# Patient Record
Sex: FEMALE | Race: WHITE | NOT HISPANIC OR LATINO | Employment: FULL TIME | URBAN - METROPOLITAN AREA
[De-identification: names, ages, dates, MRNs, and addresses within clinical notes are randomized per-mention and may not be internally consistent; named-entity substitution may affect disease eponyms.]

---

## 2017-05-05 ENCOUNTER — COMMUNICATION - HEALTHEAST (OUTPATIENT)
Dept: FAMILY MEDICINE | Facility: CLINIC | Age: 57
End: 2017-05-05

## 2017-05-09 ENCOUNTER — OFFICE VISIT - HEALTHEAST (OUTPATIENT)
Dept: FAMILY MEDICINE | Facility: CLINIC | Age: 57
End: 2017-05-09

## 2017-05-09 ENCOUNTER — COMMUNICATION - HEALTHEAST (OUTPATIENT)
Dept: FAMILY MEDICINE | Facility: CLINIC | Age: 57
End: 2017-05-09

## 2017-05-09 DIAGNOSIS — Z00.00 HEALTH CARE MAINTENANCE: ICD-10-CM

## 2017-05-09 DIAGNOSIS — N63.0 LUMP OR MASS IN BREAST: ICD-10-CM

## 2017-05-09 LAB
CHOLEST SERPL-MCNC: 186 MG/DL
FASTING STATUS PATIENT QL REPORTED: YES
HDLC SERPL-MCNC: 52 MG/DL
LDLC SERPL CALC-MCNC: 114 MG/DL
TRIGL SERPL-MCNC: 98 MG/DL

## 2017-05-09 ASSESSMENT — MIFFLIN-ST. JEOR: SCORE: 1619.1

## 2017-05-12 ENCOUNTER — HOSPITAL ENCOUNTER (OUTPATIENT)
Dept: MAMMOGRAPHY | Facility: CLINIC | Age: 57
Discharge: HOME | End: 2017-05-12
Attending: NURSE PRACTITIONER

## 2017-05-12 ENCOUNTER — HOSPITAL ENCOUNTER (OUTPATIENT)
Dept: ULTRASOUND IMAGING | Facility: CLINIC | Age: 57
Discharge: HOME | End: 2017-05-12
Attending: NURSE PRACTITIONER

## 2017-05-12 DIAGNOSIS — N63.0 LUMP OR MASS IN BREAST: ICD-10-CM

## 2017-05-16 LAB
BKR LAB AP ABNORMAL BLEEDING: NO
BKR LAB AP BIRTH CONTROL/HORMONES: NORMAL
BKR LAB AP CERVICAL APPEARANCE: NORMAL
BKR LAB AP GYN ADEQUACY: NORMAL
BKR LAB AP GYN INTERPRETATION: NORMAL
BKR LAB AP HPV REFLEX: NO
BKR LAB AP LMP: NORMAL
BKR LAB AP PATIENT STATUS: NORMAL
BKR LAB AP PREVIOUS ABNORMAL: NORMAL
BKR LAB AP PREVIOUS NORMAL: NORMAL
HIGH RISK?: NO
PATH REPORT.COMMENTS IMP SPEC: NORMAL
RESULT FLAG (HE HISTORICAL CONVERSION): NORMAL

## 2017-09-20 ENCOUNTER — AMBULATORY - HEALTHEAST (OUTPATIENT)
Dept: LAB | Facility: CLINIC | Age: 57
End: 2017-09-20

## 2017-09-20 DIAGNOSIS — Z79.899 ENCOUNTER FOR LONG-TERM (CURRENT) USE OF MEDICATIONS: ICD-10-CM

## 2017-09-20 LAB
ALT SERPL W P-5'-P-CCNC: 21 U/L (ref 0–45)
AST SERPL W P-5'-P-CCNC: 21 U/L (ref 0–40)
CREAT SERPL-MCNC: 0.72 MG/DL (ref 0.6–1.1)
GFR SERPL CREATININE-BSD FRML MDRD: >60 ML/MIN/1.73M2

## 2017-10-10 ENCOUNTER — AMBULATORY - HEALTHEAST (OUTPATIENT)
Dept: LAB | Facility: CLINIC | Age: 57
End: 2017-10-10

## 2017-10-10 DIAGNOSIS — L70.8 OTHER ACNE: ICD-10-CM

## 2017-10-10 DIAGNOSIS — Z79.899 ENCOUNTER FOR LONG-TERM (CURRENT) USE OF MEDICATIONS: ICD-10-CM

## 2018-04-27 ENCOUNTER — OFFICE VISIT - HEALTHEAST (OUTPATIENT)
Dept: FAMILY MEDICINE | Facility: CLINIC | Age: 58
End: 2018-04-27

## 2018-09-07 ENCOUNTER — OFFICE VISIT - HEALTHEAST (OUTPATIENT)
Dept: FAMILY MEDICINE | Facility: CLINIC | Age: 58
End: 2018-09-07

## 2018-09-07 DIAGNOSIS — Z86.2 HISTORY OF ANEMIA: ICD-10-CM

## 2018-09-07 LAB
BASOPHILS # BLD AUTO: 0 THOU/UL (ref 0–0.2)
BASOPHILS NFR BLD AUTO: 0 % (ref 0–2)
EOSINOPHIL # BLD AUTO: 0.1 THOU/UL (ref 0–0.4)
EOSINOPHIL NFR BLD AUTO: 3 % (ref 0–6)
ERYTHROCYTE [DISTWIDTH] IN BLOOD BY AUTOMATED COUNT: 13.8 % (ref 11–14.5)
FERRITIN SERPL-MCNC: 31 NG/ML (ref 10–130)
FOLATE SERPL-MCNC: 11.7 NG/ML
HCT VFR BLD AUTO: 40.5 % (ref 35–47)
HGB BLD-MCNC: 13.5 G/DL (ref 12–16)
LYMPHOCYTES # BLD AUTO: 1.4 THOU/UL (ref 0.8–4.4)
LYMPHOCYTES NFR BLD AUTO: 37 % (ref 20–40)
MCH RBC QN AUTO: 27.6 PG (ref 27–34)
MCHC RBC AUTO-ENTMCNC: 33.4 G/DL (ref 32–36)
MCV RBC AUTO: 82 FL (ref 80–100)
MONOCYTES # BLD AUTO: 0.3 THOU/UL (ref 0–0.9)
MONOCYTES NFR BLD AUTO: 8 % (ref 2–10)
NEUTROPHILS # BLD AUTO: 2 THOU/UL (ref 2–7.7)
NEUTROPHILS NFR BLD AUTO: 52 % (ref 50–70)
PLATELET # BLD AUTO: 288 THOU/UL (ref 140–440)
PMV BLD AUTO: 7.6 FL (ref 7–10)
RBC # BLD AUTO: 4.91 MILL/UL (ref 3.8–5.4)
VIT B12 SERPL-MCNC: 465 PG/ML (ref 213–816)
WBC: 3.9 THOU/UL (ref 4–11)

## 2021-02-15 ENCOUNTER — TRANSFERRED RECORDS (OUTPATIENT)
Dept: MULTI SPECIALTY CLINIC | Facility: CLINIC | Age: 61
End: 2021-02-15

## 2021-02-15 LAB — PAP-ABSTRACT: NORMAL

## 2021-05-30 VITALS — BODY MASS INDEX: 36.39 KG/M2 | WEIGHT: 226.44 LBS | HEIGHT: 66 IN

## 2021-06-01 VITALS — BODY MASS INDEX: 36.81 KG/M2 | WEIGHT: 226 LBS

## 2021-06-01 VITALS — BODY MASS INDEX: 35.67 KG/M2 | WEIGHT: 219 LBS

## 2021-06-10 NOTE — PROGRESS NOTES
Labs reviewed and within normal limits with exception of slightly low HGB. Recommend if noticing any fatigue she can start a multivitamin with iron.

## 2021-06-10 NOTE — PROGRESS NOTES
ASSESSMENT & PLAN:  1. Health care maintenance  Update fasting lab work today and patient due for Pap smear.  Will follow for results and let her know when these are available.  Patient feeling well without concerns besides to investigate the lump she found in the breast.  She takes no current medications and so is not requiring refills today.  I did encourage her to take 2000 IUs of vitamin D daily.  She is planning to increase her exercise regime and start using my fitness pal for tracking calorie intake.  Encouraged her in this endeavor.  - Gynecologic Cytology (PAP Smear)  - Lipid Cascade FASTING  - Thyroid [place]  - Basic Metabolic Panel  - HM2(CBC w/o Differential)    2. Lump or mass in breast  She due for screening mammogram but given the lump in the left breast will also do diagnostic imaging.  Discussed with our  to try to get the patient an appointment in the next couple days.  - US Breast Limited (Focal) Left; Future  - Mammo Diagnostic Bilateral; Future      There are no Patient Instructions on file for this visit.    Orders Placed This Encounter   Procedures     US Breast Limited (Focal) Left     Standing Status:   Future     Standing Expiration Date:   5/9/2018     Order Specific Question:   Is the patient pregnant?     Answer:   No     Order Specific Question:   Can the procedure be changed per Radiologist protocol?     Answer:   Yes     Mammo Diagnostic Bilateral     Standing Status:   Future     Standing Expiration Date:   5/9/2018     Order Specific Question:   Patient's previous breast density:     Answer:   Heterogeneously dense [3]     Order Specific Question:   Is the patient pregnant?     Answer:   No     Order Specific Question:   Can the procedure be changed per Radiologist protocol?     Answer:   Yes     Lipid [place] FASTING     Order Specific Question:   Fasting is required?     Answer:   Yes     Thyroid [place]     Basic Metabolic Panel     HM2(CBC w/o Differential)      Medications Discontinued During This Encounter   Medication Reason     omeprazole (PRILOSEC) 20 MG capsule Therapy completed           General  Immunizations reviewed and updated .  Alcohol use, safety and moderation discussed.  Recommended adequate calcium intake/osteoporosis prevention.  Discussed colon cancer screening at age 50, 45 if -American.  Diet and exercise reviewed, including goal of aerobic exercise 30-90 minutes most days of the week, moderation of portions sizes, avoiding eating out and fast food and increase in fruits and vegetables.  Discussed safe sex practices.  Discussed & recommended seat belt (& motorcycle helmet) use.  Discussed & recommended smoke detector.  Discussed sun protection.  Discussed weight management.    The following high BMI interventions were performed this visit: encouragement to exercise, weight monitoring and dietary management education, guidance, and counseling    CHIEF COMPLAINT:  Chief Complaint   Patient presents with     Establish Care     px, pt is fasting, pt found a lump in her left breast that she would like to have checked        HISTORY OF PRESENT ILLNESS:  Danisha is a 56 y.o. female presenting to the clinic today for a physical examination.  She is feeling generally well and is without complaints except for having found a lump in her left breast about a week ago.  She is due for mammogram and so would like to have this checked anyway, she has had some cysts found before in prior mammograms.  She is otherwise feeling well, currently not taking any medications.  She does have a remote history of depression, anxiety, and tennis elbow.  She is trying to work on her weight and has recently made some dietary changes as well as starting to use my fitness pal jovana to track calories.  Now that is nice outside she plans to get outside more and walk.    REVIEW OF SYSTEMS:   All other systems are negative.    PFSH:  Social History:  The patient reports that  there is not domestic violence in her life.     Regular Exercise: no  Sunscreen Use: yes  Healthy Diet: yes  Dental Visits Regularly: yes  Sexually active: yes  Colonoscopy: not applicable  Prevention of Osteoporosis: yes   Last DXA: not applicable    Social History     Social History     Marital status:      Spouse name: N/A     Number of children: N/A     Years of education: N/A     Occupational History     Not on file.     Social History Main Topics     Smoking status: Former Smoker     Smokeless tobacco: Not on file     Alcohol use Not on file     Drug use: Not on file     Sexual activity: Yes     Birth control/ protection: Surgical     Other Topics Concern     Not on file     Social History Narrative       History   Smoking Status     Former Smoker   Smokeless Tobacco     Not on file       Family History:  Family History   Problem Relation Age of Onset     Hypertension Mother      Alcohol abuse Father      Diabetes Maternal Grandmother      Colon cancer Maternal Grandfather      No Medical Problems Sister      No Medical Problems Daughter      No Medical Problems Paternal Grandmother      Colon cancer Paternal Grandfather      No Medical Problems Maternal Aunt      No Medical Problems Paternal Aunt      BRCA 1/2 Neg Hx      Breast cancer Neg Hx      Cancer Neg Hx      Endometrial cancer Neg Hx      Ovarian cancer Neg Hx        Past Medical History:  Active Ambulatory Problems     Diagnosis Date Noted     Rosacea      Lateral Epicondylitis Of The Right Elbow      Single Episode Major Depression In Full Remission      Major Depression, Single Episode      Lump Or Mass In Breast      Anxiety      Obesity 04/03/2015     Resolved Ambulatory Problems     Diagnosis Date Noted     No Resolved Ambulatory Problems     Past Medical History:   Diagnosis Date     Anxiety      GERD (gastroesophageal reflux disease)        No Known Allergies    Immunization History   Administered Date(s) Administered     Influenza,  "seasonal,quad inj 6-35 mos 10/16/2012     Tdap 2011     Immunization status: up to date and documented    Gynecologic History:  Patient's last menstrual period was 2017.  Contraception:None  Last Pap: . Results were: normal  Last mammogram: . Results were: normal    OB History:  OB History      Para Term  AB TAB SAB Ectopic Multiple Living    2 2 2                 Surgical History:  Past Surgical History:   Procedure Laterality Date      SECTION, CLASSIC       [place]  DELIVERY ONLY      Description:  Section;  Recorded: 10/14/2011;  Comments: 2     [place] LIGATE FALLOPIAN TUBE      Description: Tubal Ligation;  Recorded: 10/14/2011;     TUBAL LIGATION         VITALS:  Vitals:    17 1009   BP: 128/74   Pulse: 72   Resp: 16   Weight: (!) 226 lb 7 oz (102.7 kg)   Height: 5' 5.7\" (1.669 m)     Wt Readings from Last 3 Encounters:   17 (!) 226 lb 7 oz (102.7 kg)   11/17/15 222 lb (100.7 kg)   04/03/15 (!) 224 lb 9.6 oz (101.9 kg)       PHYSICAL EXAM:  General Appearance: Reveals an alert, cooperative 56-year-old female.   Vitals:  Per nursing notes.  Head: Normocephalic, without obvious abnormality, atraumatic   Eyes: PERRL, conjunctiva/corneas clear, EOM's intact   Ears: Normal TM's and external ear canals, both ears   Oropharynx: Nares normal, septum midline, mucosa normal, no drainage, lips, and tongue normal   Neck: Supple, symmetrical, trachea midline, no adenopathy; thyroid: not enlarged, symmetric, no tenderness/mass/nodules   Back: Symmetric, no curvature, ROM normal, no CVA tenderness   Lungs: Clear to auscultation bilaterally, respirations unlabored, no wheezing or rales   Heart: Regular rate and rhythm, S1 and S2 normal, no murmur, rub, or gallop, no carotid bruits  Breasts: Left breast at the approximately 3 o'clock position about 1-1/2 inches to the left of the nipple there is a mobile, firm mass palpated, approximately 1-1/2 cm in diameter.  Right " side without  breast masses. Neither breast has tenderness, asymmetry, or nipple discharge.   Abdomen: Soft, non-tender, no masses, no organomegaly,  Pelvic: Normal-appearing female genitalia. No adnexal masses or cervical motion tenderness on bimanual exam.   Extremities: Extremities normal, atraumatic, no cyanosis or edema   Skin: Skin color, texture, turgor normal, no rashes or lesions   Lymph nodes: Cervical, supraclavicular, and axillary nodes normal.  Neurologic: Normal   Psych: Normal affect. Does not appear anxious or depressed.     DATA REVIEWED:  Additional History from Old Records or Another Person Summarized (2 total): None.     Decision to Obtain Extra information (1 total): None.     Radiology Tests Summarized and Ordered (XRAY/CT/MRI/DXA) (1 total): breast US    Labs Reviewed and Ordered (1 total): labs as ordered    Medicine Tests Summarized and Ordered (EKG/ECHO/COLONOSCOPY/EGD) (1 total): None.    Independent Review of EKG or X-Ray (2 each): None.    The visit lasted a total of 45 minutes face to face with the patient. Over 50% of the time was spent conducting the physical and in coordination of care.      MEDICATIONS:  No current outpatient prescriptions on file.     No current facility-administered medications for this visit.        Total Data Points: 2    Shila Delvalle CNP    This note has been dictated using voice recognition software. Any grammatical or context distortions are unintentional and inherent to the software

## 2021-06-16 PROBLEM — S76.392A: Status: ACTIVE | Noted: 2018-04-27

## 2021-06-20 NOTE — PROGRESS NOTES
ASSESSMENT:  1. History of anemia  HM1(CBC and Differential)    Vitamin B12    Folate, Serum    Ferritin    HM1 (CBC with Diff)       PLAN:  Check blood today related to previous history of anemia, will make recommendations based on findings.  Since patient feeling well we could make some dietary changes if iron is low, versus having her take a supplement.  No problem-specific Assessment & Plan notes found for this encounter.    The following high BMI interventions were performed this visit: encouragement to exercise and weight monitoring  There are no Patient Instructions on file for this visit.    Orders Placed This Encounter   Procedures     Vitamin B12     Folate, Serum     Ferritin     HM1 (CBC with Diff)     There are no discontinued medications.    No Follow-up on file.    CHIEF COMPLAINT:  Chief Complaint   Patient presents with     Anemia     pt has concerns about anemia and would like her hemoglobin checked        HISTORY OF PRESENT ILLNESS:  Danisha is a 57 y.o. female here today to discuss potential anemia.  Patient has been monitoring her blood sugars at home as she was for a while not feeling well and suspecting some insulin resistance, and her meter also showed her hemoglobin and hematocrit were low.  Reading on her meter at home was 9.8.  She had previously been a little bit low on her last lab work and at the time was having some heavy perimenopausal bleeding.  Since then that has slowed and so she was surprised to see this reading below.  She is here today for follow-up on this to see if it is continuing to be low on a blood draw or if it is improving.  She had some bleeding about 3 weeks ago, previous to that about 9 months prior.  Last period was not too heavy, she was surprised she got one is she thought she might be going fully into menopause.  Patient has not had any known anemias in the past.  Based on her not feeling well recently she has adjusted her diet to more of a keto or very low carb  diet.  She is feeling excellent with this change and has lost a little bit of weight.    REVIEW OF SYSTEMS:      Pertinent items are noted in HPI.  All other systems are negative  PFSH:  Reviewed, no changes      TOBACCO USE:  History   Smoking Status     Former Smoker   Smokeless Tobacco     Never Used       VITALS:  Vitals:    09/07/18 0909   BP: 126/74   Patient Site: Left Arm   Patient Position: Sitting   Cuff Size: Adult Regular   Pulse: 69   Resp: 14   SpO2: 96%   Weight: 219 lb (99.3 kg)     Wt Readings from Last 3 Encounters:   09/07/18 219 lb (99.3 kg)   04/27/18 (!) 226 lb (102.5 kg)   05/09/17 (!) 226 lb 7 oz (102.7 kg)       PHYSICAL EXAM:   /74 (Patient Site: Left Arm, Patient Position: Sitting, Cuff Size: Adult Regular)  Pulse 69  Resp 14  Wt 219 lb (99.3 kg)  LMP 08/17/2018  SpO2 96%  BMI 35.67 kg/m2  General appearance: alert, appears stated age and cooperative  Lungs: clear to auscultation bilaterally  Heart: regular rate and rhythm, S1, S2 normal, no murmur, click, rub or gallop  Neurologic: Grossly normal  Psychologic: Mood and affect normal.      DATA REVIEWED:  Additional History from Old Records Summarized (2): 0  Decision to Obtain Records (1): 0  Radiology Tests Summarized or Ordered (1): 0  Labs Reviewed or Ordered (1): 1  Medicine Test Summarized or Ordered (1): 0  Independent Review of EKG or X-RAY(2 each): 0    The visit lasted a total of 20 minutes face to face with the patient. Over 50% of the time was spent counseling and educating the patient about plan of care.    MEDICATIONS:  No current outpatient prescriptions on file.     No current facility-administered medications for this visit.        This note has been dictated using voice recognition software. Any grammatical or context distortions are unintentional and inherent to the software

## 2021-06-26 NOTE — PROGRESS NOTES
Progress Notes by Syd Johnson DO at 2018 12:20 PM     Author: Syd Johnson DO Service: -- Author Type: Physician    Filed: 2018 12:47 PM Encounter Date: 2018 Status: Signed    : Syd Johnson DO (Physician)       Chief Complaint   Patient presents with   ? Leg Pain     middle of the left thigh, pt is concern . started 1x day ago. pain is getting worse       History of Present Illness:     Danisha is a pleasant who presented to [facility] concerned about left thigh pain.     The patient reports 1 day history of left thigh pain worse with movement.  She denies any recent surgery, prolonged immobilization, history of malignancy or history of DVT.  She reports that she had recent long distance travel for 5 hours but took breaks in between.  She denies any calf pain or tenderness, no varicose veins noted.  No history of vein harvestation.  Patient denies any back pain, numbness or tingling or paresthesias down her lower extremities.  The patient denies any fever, chills or rigors.  The patient has not taken any Tylenol as both Tylenol for pain OTC at home.     Review of systems: See history of present illness, otherwise negative.   No current outpatient prescriptions on file.     No current facility-administered medications for this visit.       Past Medical History:   Diagnosis Date   ? Anxiety     situational.  Took meds for 9 mos.   ? GERD (gastroesophageal reflux disease)       Past Surgical History:   Procedure Laterality Date   ?  SECTION, CLASSIC     ? [place]  DELIVERY ONLY      Description:  Section;  Recorded: 10/14/2011;  Comments: 2   ? [place] LIGATE FALLOPIAN TUBE      Description: Tubal Ligation;  Recorded: 10/14/2011;   ? TUBAL LIGATION        Social History     Social History   ? Marital status:      Spouse name: N/A   ? Number of children: N/A   ? Years of education: N/A     Social History Main Topics   ? Smoking status: Former  Smoker   ? Smokeless tobacco: Never Used   ? Alcohol use None   ? Drug use: None   ? Sexual activity: Yes     Birth control/ protection: Surgical     Other Topics Concern   ? None     Social History Narrative      History   Smoking Status   ? Former Smoker   Smokeless Tobacco   ? Never Used      Exam:   Blood pressure 124/74, pulse 80, temperature 98  F (36.7  C), temperature source Oral, resp. rate 14, weight (!) 226 lb (102.5 kg), SpO2 98 %, not currently breastfeeding.   General:58 y/o female in NAD.  Respiratory: Lungs are clear to auscultation and percussion.  CVS: Regular rate and rhythm without murmur, rub, or gallop.  MUSCULOSKELETAL: Tenderness over the hamstrings with stretching/extension of the hip. Full range of motion of his legs and hips bilaterally with no tenderness to palpation over his buttocks.  Extremities: No calf pain, tenderness or swelling noted. No varicose veins appreciated.    No results found for this or any previous visit (from the past 24 hour(s)).   Assessment/Plan   1. Hamstring sprain, left, initial encounter        Patient Instructions       Discussed with the patient that she pulled her left hamstring.  Use over-the-counter Tylenol or ibuprofen for pain control.    Hamstring Stretch    Begin your rehabilitation with exercises that develop muscle control. These help you meet basic goals, like driving a car or going back to work. Exercise as often as youre advised. But stop right away if any exercise causes sharp or increasing pain. Icing your knee for 15 to 20 minutes after exercise can help prevent swelling and soreness.    Lie on your back with your good knee bent. Put a towel around the back of your injured leg. Tighten your stomach muscles.    Keeping the knee as straight as you can, slowly pull on the towel to bring your injured leg up. Raise it as far as you comfortably can.    Hold for 30 to 60 seconds. Repeat 2 to 3 times.   Caution: If you feel tingling or pain in your back  or legs, youre not yet ready for this exercise or are pulling too aggressively.   For your safety, check with your healthcare provider before starting an exercise program.   Date Last Reviewed: 8/16/2015 2000-2016 [employer]. [address] [place] [address], [place] [address]. All rights reserved. This information is not intended as a substitute for professional medical care. Always follow your healthcare professional's instructions.           Syd Johnson, DO

## 2021-07-24 ENCOUNTER — HEALTH MAINTENANCE LETTER (OUTPATIENT)
Age: 61
End: 2021-07-24

## 2021-09-18 ENCOUNTER — HEALTH MAINTENANCE LETTER (OUTPATIENT)
Age: 61
End: 2021-09-18

## 2022-08-14 ENCOUNTER — HEALTH MAINTENANCE LETTER (OUTPATIENT)
Age: 62
End: 2022-08-14

## 2022-11-19 ENCOUNTER — HEALTH MAINTENANCE LETTER (OUTPATIENT)
Age: 62
End: 2022-11-19

## 2023-03-28 ENCOUNTER — OFFICE VISIT (OUTPATIENT)
Dept: FAMILY MEDICINE | Facility: OTHER | Age: 63
End: 2023-03-28
Attending: FAMILY MEDICINE
Payer: OTHER MISCELLANEOUS

## 2023-03-28 ENCOUNTER — HOSPITAL ENCOUNTER (OUTPATIENT)
Dept: GENERAL RADIOLOGY | Facility: OTHER | Age: 63
Discharge: HOME | End: 2023-03-28
Attending: FAMILY MEDICINE
Payer: OTHER MISCELLANEOUS

## 2023-03-28 VITALS
RESPIRATION RATE: 16 BRPM | BODY MASS INDEX: 37.23 KG/M2 | TEMPERATURE: 97.9 F | OXYGEN SATURATION: 98 % | SYSTOLIC BLOOD PRESSURE: 136 MMHG | WEIGHT: 228.6 LBS | DIASTOLIC BLOOD PRESSURE: 80 MMHG | HEART RATE: 78 BPM
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
## 2023-03-28 DIAGNOSIS — S83.206A ACUTE MENISCAL TEAR OF RIGHT KNEE, INITIAL ENCOUNTER: ICD-10-CM

## 2023-03-28 DIAGNOSIS — S80.01XA CONTUSION OF RIGHT KNEE, INITIAL ENCOUNTER: ICD-10-CM

## 2023-03-28 DIAGNOSIS — M25.461 EFFUSION OF RIGHT KNEE: ICD-10-CM

## 2023-03-28 PROCEDURE — 99214 OFFICE O/P EST MOD 30 MIN: CPT | Performed by: FAMILY MEDICINE

## 2023-03-28 PROCEDURE — 73560 X-RAY EXAM OF KNEE 1 OR 2: CPT | Mod: LT

## 2023-03-28 RX ORDER — NAPROXEN 500 MG/1
500 TABLET ORAL 2 TIMES DAILY WITH MEALS
Qty: 60 TABLET | Refills: 0 | Status: SHIPPED | OUTPATIENT
Start: 2023-03-28

## 2023-03-28 ASSESSMENT — PAIN SCALES - GENERAL: PAINLEVEL: MODERATE PAIN (5)

## 2023-03-28 NOTE — PROGRESS NOTES
Sports Medicine Office Note    HPI:  62-year-old female coming in for evaluation of a right knee injury that occurred on 3/22.  She fell on ice outside of work.  She does not recall the exact mechanism of her injury but thinks there may have been a twisting component to it.  She rates her pain at 5/10.  She characterized the pain as dull and burning.  She has difficulty with bending her knee and going downstairs.  She has tried resting to help with her symptoms.  She feels that she has associated swelling.  She has a history of a fall 5 weeks ago where she fell forward onto her bilateral knees.  She did have some pain in the anterior knee following this injury.  Prior to this no previous knee pain/injury.      EXAM:  /80 (BP Location: Right arm, Patient Position: Sitting, Cuff Size: Adult Large)   Pulse 78   Temp 97.9  F (36.6  C) (Temporal)   Resp 16   Wt 103.7 kg (228 lb 9.6 oz)   SpO2 98%   BMI 37.23 kg/m    MUSCULOSKELETAL EXAM:  RIGHT KNEE  Inspection:  -No gross deformity  -No bruising or soft tissue swelling  -Scars:  None    Tenderness to palpation of the:  -Quadriceps musculature:  Negative  -Quadriceps tendon:  Negative  -Patella:  Negative  -Medial patellar facet: Positive  -Lateral patellar facet: Positive  -Inferior pole of the patella:  Negative  -Patellar tendon:  Negative  -Tibial tuberosity:  Negative  -Medial joint line: Mild pain  -Medial collateral ligament: Mild pain at the joint line  -Medial hamstring tendons:  Negative  -Medial femoral condyle:  Negative  -Medial tibial plateau:  Negative  -Pes anserine bursa:  Negative  -Lateral joint line:  Negative  -Distal IT band:  Negative  -Gerdy's tubercle:  Negative  -Lateral collateral ligament:  Negative  -Lateral hamstring tendons:  Negative  -Lateral femoral condyle:  Negative  -Lateral tibial plateau:  Negative  -Posterior lateral corner:  Negative  -Popliteal fossa:  Negative    Range of Motion:  -Passive flexion:  120 with pain at  endrange  -Passive extension:  0    Strength:  -Extension:  5/5  -Flexion:  5/5    Special Tests:  -Effusion: Moderate  -Medial patellar glide:  Negative  -Lateral patellar glide:  Negative  -Patellar apprehension:  Negative  -Medial Xavi's: Weakly positive  -Lateral Xavi's: Weakly positive  -Valgus stress:  Negative  -Varus stress:  Negative  -Lachman test:  Negative  -Anterior drawer:  Negative  -Posterior drawer:  Negative  -Apley test: Positive  -Thessaly test: Negative    Other:  -Intact sensation to light touch distally.  -No signs of cyanosis. Normal skin temperature of the lower extremity.  -Foot/ankle:  No gross deformity. Full range of motion.  -Left knee:  No gross deformity. No palpable tenderness. Normal strength and ROM.      IMAGING:  3/28/2023: 4 view right knee x-ray  - No fracture, dislocation, or bony lesion.  Joint spaces appear well-preserved.      ASSESSMENT/PLAN:  Diagnoses and all orders for this visit:  Accident while engaged in work-related activity  -     XR Knee Standing 2v  Bilateral & 2v Right  -     naproxen (NAPROSYN) 500 MG tablet; Take 1 tablet (500 mg) by mouth 2 times daily (with meals) X7-14 days then 1 tab bid prn thereafter.  Take with food.  Effusion of right knee  -     XR Knee Standing 2v  Bilateral & 2v Right  -     naproxen (NAPROSYN) 500 MG tablet; Take 1 tablet (500 mg) by mouth 2 times daily (with meals) X7-14 days then 1 tab bid prn thereafter.  Take with food.  Acute meniscal tear of right knee, initial encounter  -     naproxen (NAPROSYN) 500 MG tablet; Take 1 tablet (500 mg) by mouth 2 times daily (with meals) X7-14 days then 1 tab bid prn thereafter.  Take with food.  Contusion of right knee, initial encounter  -     naproxen (NAPROSYN) 500 MG tablet; Take 1 tablet (500 mg) by mouth 2 times daily (with meals) X7-14 days then 1 tab bid prn thereafter.  Take with food.  Fall due to slipping on ice or snow, initial encounter    62-year-old female with an acute  right knee injury with associated effusion.  Today's evaluation suggest a meniscal tear.  No evidence to suggest major ligamentous tear.  X-rays were performed in the office today and personally reviewed by me with the findings as demonstrated above by my interpretation.  We discussed treatment options which include rest, activity modification, icing, topical medications, oral medications, physical therapy, injections, and surgical referral.  At this time we will start with more conservative interventions and advance as symptoms necessitate.  - Handout given with home exercises for meniscal injury  - Discussed icing 3-6 times per day as able  - Naproxen 500 mg twice daily x7-14 days then as needed thereafter  - If symptoms persist, consider formal PT referral or MRI (if symptoms are quite debilitating)  - Could consider CSI though would want to have MR evaluation prior to performing this to know full extent of soft tissue damage within the joint      Good Callahan MD  3/28/2023  2:08 PM    Total time spent with this patient was 26 minutes which included chart review, visualization and independent interpretation of images, time spent with the patient, and documentation.    Procedure time:  0 minute(s)

## 2023-03-28 NOTE — NURSING NOTE
"Chief Complaint   Patient presents with     Knee Injury     Right knee injury, DOI: 3/22/23     Patient presents for work comp right knee injury. DOI: 3/22/23. Injured from a fall in the parking lot at [employer] where she works from snow and ice. Pain 5/10. Patient denies past right knee surgery.     Initial /80 (BP Location: Right arm, Patient Position: Sitting, Cuff Size: Adult Large)   Pulse 78   Temp 97.9  F (36.6  C) (Temporal)   Resp 16   Wt 103.7 kg (228 lb 9.6 oz)   SpO2 98%   BMI 37.23 kg/m   Estimated body mass index is 37.23 kg/m  as calculated from the following:    Height as of 5/9/17: 1.669 m (5' 5.7\").    Weight as of this encounter: 103.7 kg (228 lb 9.6 oz).       Medication Reconciliation: Complete    Tabby Connelly LPN .......  3/28/2023  2:16 PM   "

## 2023-09-10 ENCOUNTER — HEALTH MAINTENANCE LETTER (OUTPATIENT)
Age: 63
End: 2023-09-10

## 2024-11-03 ENCOUNTER — HEALTH MAINTENANCE LETTER (OUTPATIENT)
Age: 64
End: 2024-11-03